# Patient Record
(demographics unavailable — no encounter records)

---

## 2025-01-29 NOTE — PHYSICAL EXAM
[Alert] : alert [Normal Voice/Communication] : normal voice/communication [Healthy Appearing] : healthy appearing [No Acute Distress] : no acute distress [Sclera] : the sclera and conjunctiva were normal [Hearing Threshold Finger Rub Not [place]] : hearing was normal [Normal Appearance] : the appearance of the neck was normal [No Respiratory Distress] : no respiratory distress [No Acc Muscle Use] : no accessory muscle use [Respiration, Rhythm And Depth] : normal respiratory rhythm and effort [Auscultation Breath Sounds / Voice Sounds] : lungs were clear to auscultation bilaterally [Heart Rate And Rhythm] : heart rate was normal and rhythm regular [Normal S1, S2] : normal S1 and S2 [Murmurs] : no murmurs [Bowel Sounds] : normal bowel sounds [Abdomen Tenderness] : non-tender [No Masses] : no abdominal mass palpated [Abdomen Soft] : soft [No CVA Tenderness] : no CVA  tenderness [Abnormal Walk] : normal gait [Normal Color / Pigmentation] : normal skin color and pigmentation [No Focal Deficits] : no focal deficits [Oriented To Time, Place, And Person] : oriented to person, place, and time

## 2025-01-29 NOTE — HISTORY OF PRESENT ILLNESS
[de-identified] : 5/7/21 Hill grade 1 GE junction.  Mild hyperemia.  Biopsied from distal esophagus to rule out gastral esophageal reflux.  Normal stomach.  Biopsies were H. pylori and intestinal aplasia.  Linear hyperemia in antrum.  Normal duodenum. [FreeTextEntry1] : 1/28/2020: Normal colon, biopsy to rule microscopic colitis 10/10/2022: Mild inflammation, nonspecific.  Unclear if due to prep related inflammation or chronic. Biopsied. External hemorrhoids.

## 2025-01-29 NOTE — ASSESSMENT
[FreeTextEntry1] : Impression: 1.  Chronic diarrhea -most likely irritable bowel with diarrhea.  He has had prior endoscopic workup which were all unremarkable.  No alarm symptoms.  No family history of autoimmune disease, extremely low suspicion for gluten sensitivity 2. Chronic GERD  Plan: -Check stool calprotectin, ova and parasite, elastase -If stool tests are all normal, given chronicity of symptoms and no other etiologies, most likely irritable bowel with diarrhea -Discussed that we should avoid high FODMAPs.  Explained high FODMAP foods, and also encouraged patient to consume a high-fiber diet -Discussed using Xifaxan as per guidelines for his chronic diarrhea related to the IBS.  He him aware of potential side effects, and that over time with bacteria can grow back and he might require retreatment -Also discussed the alternative which includes low-dose TCA.  Made him aware of the side effects including constipation, dry mouth, drowsiness -Patient would like to first take a course of Xifaxan to see if it improves his symptoms -Advised lifestyle modifications for reflux. Asked patient to avoid eating 3 hours before going to bed or laying down, and to elevate the head of his bed. -Explained to him the potential long-term side effects of PPI including that of osteoporosis and infections.  However, given he is already failed use of H2 blockers and lifestyle modifications, agree with using PPIs at lowest dose for now as needed to control his reflux  RTC after course of Xifaxan and stool testing as above

## 2025-03-19 NOTE — CONSULT LETTER
[Dear  ___] : Dear  [unfilled], [Consult Letter:] : I had the pleasure of evaluating your patient, [unfilled]. [Please see my note below.] : Please see my note below. [Consult Closing:] : Thank you very much for allowing me to participate in the care of this patient.  If you have any questions, please do not hesitate to contact me. [Sincerely,] : Sincerely, [FreeTextEntry3] : Adrián Youngblood MD PhD

## 2025-03-19 NOTE — PHYSICAL EXAM
[de-identified] : The patient is alert, cooperative, and oriented x 3. Pupils are equal and round. The patient does not have skin rash.   Gait is normal. Back ROM is within normal range. Tenderness at PVM. SLR negative. DTR normal range. Motor and sensory are basically normal throughout bilateral L/E. Circulation of legs is normal. Bladder and bowel functions are normal.

## 2025-03-19 NOTE — HISTORY OF PRESENT ILLNESS
[de-identified] : The patient is 27 years old male who has low back pain for 4 months. No radicular leg pain. No clear cause of the symptoms. Referred by Dr. Rajput   Pain Level:  4 Duration of Symptoms:  4 months Symptom course:  Same Accident:  No   Previous Treatment:    Pain meds:  No    Physical therapy:  Yes, but massage and acupuncture type    Epidural steroid injection:  No

## 2025-03-19 NOTE — DISCUSSION/SUMMARY
[de-identified] : I examined, evaluated, and discussed with the patient. The patient has low back symptoms for 4 months. No radicular leg pain.   Based on physical exam and image findings, the patient diagnosis is muscle spasm related low back pain.   Treatment plan is medications PRN and formal PT and home exercise.   The patient understands everything and all questions were answered. The patient will return in 6-8 weeks. He will get lumbar x-ray in the meantime.